# Patient Record
Sex: MALE | Race: WHITE | NOT HISPANIC OR LATINO | URBAN - METROPOLITAN AREA
[De-identification: names, ages, dates, MRNs, and addresses within clinical notes are randomized per-mention and may not be internally consistent; named-entity substitution may affect disease eponyms.]

---

## 2017-04-20 ENCOUNTER — EMERGENCY (EMERGENCY)
Facility: HOSPITAL | Age: 27
LOS: 0 days | Discharge: HOME | End: 2017-04-20
Attending: EMERGENCY MEDICINE
Payer: COMMERCIAL

## 2017-04-20 VITALS
DIASTOLIC BLOOD PRESSURE: 74 MMHG | RESPIRATION RATE: 16 BRPM | WEIGHT: 134.92 LBS | SYSTOLIC BLOOD PRESSURE: 115 MMHG | HEIGHT: 70 IN | HEART RATE: 95 BPM | TEMPERATURE: 98 F | OXYGEN SATURATION: 96 %

## 2017-04-20 VITALS
TEMPERATURE: 98 F | RESPIRATION RATE: 18 BRPM | SYSTOLIC BLOOD PRESSURE: 94 MMHG | OXYGEN SATURATION: 100 % | HEART RATE: 80 BPM | DIASTOLIC BLOOD PRESSURE: 58 MMHG

## 2017-04-20 DIAGNOSIS — F32.9 MAJOR DEPRESSIVE DISORDER, SINGLE EPISODE, UNSPECIFIED: ICD-10-CM

## 2017-04-20 DIAGNOSIS — F41.9 ANXIETY DISORDER, UNSPECIFIED: ICD-10-CM

## 2017-04-20 LAB
ALBUMIN SERPL ELPH-MCNC: 4.2 G/DL — SIGNIFICANT CHANGE UP (ref 3.3–5)
ALP SERPL-CCNC: 50 U/L — SIGNIFICANT CHANGE UP (ref 40–120)
ALT FLD-CCNC: 20 U/L — SIGNIFICANT CHANGE UP (ref 12–78)
AMPHET UR-MCNC: NEGATIVE — SIGNIFICANT CHANGE UP
ANION GAP SERPL CALC-SCNC: 6 MMOL/L — SIGNIFICANT CHANGE UP (ref 5–17)
APAP SERPL-MCNC: <2 UG/ML — LOW (ref 10–30)
APPEARANCE UR: ABNORMAL
AST SERPL-CCNC: 15 U/L — SIGNIFICANT CHANGE UP (ref 15–37)
BACTERIA # UR AUTO: ABNORMAL
BARBITURATES UR SCN-MCNC: NEGATIVE — SIGNIFICANT CHANGE UP
BASOPHILS # BLD AUTO: 0 K/UL — SIGNIFICANT CHANGE UP (ref 0–0.2)
BASOPHILS NFR BLD AUTO: 0.6 % — SIGNIFICANT CHANGE UP (ref 0–2)
BENZODIAZ UR-MCNC: NEGATIVE — SIGNIFICANT CHANGE UP
BILIRUB SERPL-MCNC: 0.4 MG/DL — SIGNIFICANT CHANGE UP (ref 0.2–1.2)
BILIRUB UR-MCNC: NEGATIVE — SIGNIFICANT CHANGE UP
BUN SERPL-MCNC: 12 MG/DL — SIGNIFICANT CHANGE UP (ref 7–23)
CALCIUM SERPL-MCNC: 9 MG/DL — SIGNIFICANT CHANGE UP (ref 8.5–10.1)
CHLORIDE SERPL-SCNC: 106 MMOL/L — SIGNIFICANT CHANGE UP (ref 96–108)
CO2 SERPL-SCNC: 30 MMOL/L — SIGNIFICANT CHANGE UP (ref 22–31)
COCAINE METAB.OTHER UR-MCNC: NEGATIVE — SIGNIFICANT CHANGE UP
COLOR SPEC: YELLOW — SIGNIFICANT CHANGE UP
COMMENT - URINE: SIGNIFICANT CHANGE UP
CREAT SERPL-MCNC: 0.69 MG/DL — SIGNIFICANT CHANGE UP (ref 0.5–1.3)
DIFF PNL FLD: NEGATIVE — SIGNIFICANT CHANGE UP
EOSINOPHIL # BLD AUTO: 0.1 K/UL — SIGNIFICANT CHANGE UP (ref 0–0.5)
EOSINOPHIL NFR BLD AUTO: 1.8 % — SIGNIFICANT CHANGE UP (ref 0–6)
EPI CELLS # UR: SIGNIFICANT CHANGE UP
ETHANOL SERPL-MCNC: <10 MG/DL — SIGNIFICANT CHANGE UP (ref 0–10)
GLUCOSE SERPL-MCNC: 100 MG/DL — HIGH (ref 70–99)
GLUCOSE UR QL: NEGATIVE MG/DL — SIGNIFICANT CHANGE UP
HCT VFR BLD CALC: 36.4 % — LOW (ref 39–50)
HGB BLD-MCNC: 13.4 G/DL — SIGNIFICANT CHANGE UP (ref 13–17)
KETONES UR-MCNC: ABNORMAL
LEUKOCYTE ESTERASE UR-ACNC: ABNORMAL
LYMPHOCYTES # BLD AUTO: 2.2 K/UL — SIGNIFICANT CHANGE UP (ref 1–3.3)
LYMPHOCYTES # BLD AUTO: 31.8 % — SIGNIFICANT CHANGE UP (ref 13–44)
MCHC RBC-ENTMCNC: 30.8 PG — SIGNIFICANT CHANGE UP (ref 27–34)
MCHC RBC-ENTMCNC: 36.9 GM/DL — HIGH (ref 32–36)
MCV RBC AUTO: 83.5 FL — SIGNIFICANT CHANGE UP (ref 80–100)
METHADONE UR-MCNC: NEGATIVE — SIGNIFICANT CHANGE UP
MONOCYTES # BLD AUTO: 0.5 K/UL — SIGNIFICANT CHANGE UP (ref 0–0.9)
MONOCYTES NFR BLD AUTO: 7.2 % — SIGNIFICANT CHANGE UP (ref 2–14)
NEUTROPHILS # BLD AUTO: 4.1 K/UL — SIGNIFICANT CHANGE UP (ref 1.8–7.4)
NEUTROPHILS NFR BLD AUTO: 58.5 % — SIGNIFICANT CHANGE UP (ref 43–77)
NITRITE UR-MCNC: NEGATIVE — SIGNIFICANT CHANGE UP
OPIATES UR-MCNC: NEGATIVE — SIGNIFICANT CHANGE UP
PCP SPEC-MCNC: SIGNIFICANT CHANGE UP
PCP UR-MCNC: NEGATIVE — SIGNIFICANT CHANGE UP
PH UR: 7 — SIGNIFICANT CHANGE UP (ref 5–8)
PLATELET # BLD AUTO: 241 K/UL — SIGNIFICANT CHANGE UP (ref 150–400)
POTASSIUM SERPL-MCNC: 4 MMOL/L — SIGNIFICANT CHANGE UP (ref 3.5–5.3)
POTASSIUM SERPL-SCNC: 4 MMOL/L — SIGNIFICANT CHANGE UP (ref 3.5–5.3)
PROT SERPL-MCNC: 7.7 GM/DL — SIGNIFICANT CHANGE UP (ref 6–8.3)
PROT UR-MCNC: 15 MG/DL
RBC # BLD: 4.36 M/UL — SIGNIFICANT CHANGE UP (ref 4.2–5.8)
RBC # FLD: 11.3 % — SIGNIFICANT CHANGE UP (ref 11–15)
SALICYLATES SERPL-MCNC: 2.4 MG/DL — LOW (ref 2.8–20)
SODIUM SERPL-SCNC: 142 MMOL/L — SIGNIFICANT CHANGE UP (ref 135–145)
SP GR SPEC: 1.01 — SIGNIFICANT CHANGE UP (ref 1.01–1.02)
THC UR QL: POSITIVE — SIGNIFICANT CHANGE UP
UROBILINOGEN FLD QL: 1 MG/DL
WBC # BLD: 7 K/UL — SIGNIFICANT CHANGE UP (ref 3.8–10.5)
WBC # FLD AUTO: 7 K/UL — SIGNIFICANT CHANGE UP (ref 3.8–10.5)
WBC UR QL: SIGNIFICANT CHANGE UP

## 2017-04-20 PROCEDURE — 99284 EMERGENCY DEPT VISIT MOD MDM: CPT

## 2017-04-20 RX ADMIN — Medication 1 MILLIGRAM(S): at 18:13

## 2017-05-09 ENCOUNTER — EMERGENCY (EMERGENCY)
Facility: HOSPITAL | Age: 27
LOS: 1 days | Discharge: HOME | End: 2017-05-09
Attending: EMERGENCY MEDICINE
Payer: COMMERCIAL

## 2017-05-09 VITALS
OXYGEN SATURATION: 98 % | RESPIRATION RATE: 20 BRPM | DIASTOLIC BLOOD PRESSURE: 81 MMHG | TEMPERATURE: 98 F | WEIGHT: 134.92 LBS | SYSTOLIC BLOOD PRESSURE: 118 MMHG | HEIGHT: 70 IN | HEART RATE: 83 BPM

## 2017-05-09 VITALS
TEMPERATURE: 99 F | DIASTOLIC BLOOD PRESSURE: 86 MMHG | HEART RATE: 83 BPM | SYSTOLIC BLOOD PRESSURE: 131 MMHG | OXYGEN SATURATION: 99 %

## 2017-05-09 DIAGNOSIS — F32.1 MAJOR DEPRESSIVE DISORDER, SINGLE EPISODE, MODERATE: ICD-10-CM

## 2017-05-09 DIAGNOSIS — F32.9 MAJOR DEPRESSIVE DISORDER, SINGLE EPISODE, UNSPECIFIED: ICD-10-CM

## 2017-05-09 PROCEDURE — 90792 PSYCH DIAG EVAL W/MED SRVCS: CPT

## 2017-05-09 PROCEDURE — 99284 EMERGENCY DEPT VISIT MOD MDM: CPT

## 2017-05-09 PROCEDURE — 99283 EMERGENCY DEPT VISIT LOW MDM: CPT

## 2017-05-09 RX ORDER — ESCITALOPRAM OXALATE 10 MG/1
1 TABLET, FILM COATED ORAL
Qty: 15 | Refills: 1 | OUTPATIENT
Start: 2017-05-09 | End: 2017-06-07

## 2017-05-09 NOTE — ED STATDOCS - CARE PLAN
Principal Discharge DX:	Major depression  Instructions for follow-up, activity and diet:	Take lexapro as prescribed.  Follow-up as scheduled with psychiatry.  Return immediately to the ER for re-evaluation if your symptoms recur or worsening.

## 2017-05-09 NOTE — ED STATDOCS - PLAN OF CARE
Take lexapro as prescribed.  Follow-up as scheduled with psychiatry.  Return immediately to the ER for re-evaluation if your symptoms recur or worsening.

## 2017-05-09 NOTE — ED ADULT TRIAGE NOTE - CHIEF COMPLAINT QUOTE
Patient arrived to ED today with c/o having major anxiety and depression issues.  Patient was in ED two weeks ago for anxiety.  Patient was told to come to ED for medication.

## 2017-05-09 NOTE — ED BEHAVIORAL HEALTH ASSESSMENT NOTE - SAFETY PLAN DETAILS
Please go to Nearest ER or call 911, If you notice any of the followin) Agitation, Aggression or Anxiety,    2) Suicidal or homicidal thoughts 3) Worsening of symptoms or 4) Side effects of medications

## 2017-05-09 NOTE — ED ADULT NURSE NOTE - OBJECTIVE STATEMENT
Patient received awake and alert, depressed mood, sleeping too much, isolative to his room, denies suicidal ideation, intent or plan. Denies any history of in-patient psych care. Lives with his parents, tells writer he spoke "Julee", at [facility] and she suggested he come here, does not have an appointment till June 6th. He is here seeking medication. Patient received awake and alert, depressed mood, sleeping too much, isolative to his room, denies suicidal ideation, intent or plan. Denies any history of in-patient psych care. Lives with his parents, tells writer he spoke "Julee", at [facility] and she suggested he come here, does not have an appointment till June 6th to see a Psychiatrist. He is here seeking medication. 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

## 2017-05-09 NOTE — ED BEHAVIORAL HEALTH ASSESSMENT NOTE - DESCRIPTION
Patient was laying in bed, in no acute distress. He appeared forthcoming. none Currently living with mother and father. Worked as a nursing aide

## 2017-05-09 NOTE — ED STATDOCS - OBJECTIVE STATEMENT
27 y/o male presents to ED with mother at bedside c/o anxiety and depression x several months. Pt reports that he has been unable to complete his usual daily activities at times and doesn't want to get out of bed in the morning and "feels fragile". He notes +recent stressors due to recent move from [place]. Pt was seen at [facility] 3 weeks ago, given Ativan and followed up with [facility] counseling, though mother assisted him in making arrangements with local counselor whom he saw today (Julee). He has a scheduled appointment with psychiatrist on June 6th, though Julee advised him to seek ED evaluation today for expedited psychiatric evaluation. No SI, HI or auditory/visual hallucinations. Pt is otherwise healthy, no PMHx or PSHx.  No h/o admission to inpatient psychiatric facility. Current smoker, occasional EtOH, occasional marijuana. NKDA. No further complaints at this time.

## 2017-05-09 NOTE — ED BEHAVIORAL HEALTH ASSESSMENT NOTE - SUMMARY
Patient is a 26 year old, homosexual male domicile with parents (as of one week ago) ; single; noncaregiver; unemployed, no significant PPH; no ;prior hospitalizations; no known suicide attempts; no known history of violence or arrests; no active substance abuse or known history of complicated withdrawal; no significant past medical history ; brought in by self accompanied by mother for evaluation of depression with requests to start treatment with medications.  Patient reports several stressors which include resigning from a job he liked, moving back with [facility], and his room mate (and ex boyfriend) moving back to [place]. He describes symptoms consistent with Major Depressive disorder--moderate.  Patient is not suicidal or homicidal and does not require inpatient psychiatric hospitalizations.  Tx options were discussed. Will start lexapro 10 mg daily to tx anxiety/depressive sx's, Risks, benefits and common side effects were discussed. Recommended patient take 1/2 tab x 3 days and if tolerating well take full tablet.  Patient to follow up with therapist and psychiatrist at [facility].  Intake appointment with Dr. Guzman on June 6th

## 2017-05-09 NOTE — ED STATDOCS - PROGRESS NOTE DETAILS
Spoke with psych NP Mindy Pt to be moved to  for further evaluation. Patient seen by Dr Mcmullen: recommends treatment with lexapro and outpatient therapy.

## 2017-05-09 NOTE — ED BEHAVIORAL HEALTH ASSESSMENT NOTE - OTHER PAST PSYCHIATRIC HISTORY (INCLUDE DETAILS REGARDING ONSET, COURSE OF ILLNESS, INPATIENT/OUTPATIENT TREATMENT)
No formal psychiatric history. Reports friend gave him a Klonopin once that helped when he was having an anxiety attack. He also notes that he was given Ativan at [facility] several weeks ago also for anxiety which he found helpful. He has no h/o prior suicide attempts.

## 2017-05-09 NOTE — ED BEHAVIORAL HEALTH ASSESSMENT NOTE - RISK ASSESSMENT
Concerning patient's suicide risk, I feel the patient's current risk is low, , as evidenced by  denial of immediate ideation or intent, no history of suicide attempts, no current drinking, a sincere interest in getting help and the genuineness of patients safety moreno without demonstrable ambivalence, future oriented, with good support

## 2017-05-09 NOTE — ED BEHAVIORAL HEALTH ASSESSMENT NOTE - HPI (INCLUDE ILLNESS QUALITY, SEVERITY, DURATION, TIMING, CONTEXT, MODIFYING FACTORS, ASSOCIATED SIGNS AND SYMPTOMS)
Patient is a 26 year old, homosexual male domicile with parents (as of one week ago) ; single; noncaregiver; unemployed, no significant PPH; no ;prior hospitalizations; no known suicide attempts; no known history of violence or arrests; no active substance abuse or known history of complicated withdrawal; no significant past medical history ; brought in by self accompanied by mother for evaluation of depression with requests to start treatment with medications.         Patient reports that his main stressors are resigning from his job that he enjoys, and his room mate moving back to [place]. As a result he moved back with his parents one week ago.  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 Patient's room mate was also his ex boyfriend, although they remain as friends and he realizes that he misses him terribly since he moved three weeks ago.         Patient mentions that for the last 2-3 weeks he has been more depressed, less motivated, over sleeping, variable appetite, crying at times, with poor concentration and low energy.  He has not been motivated to leave the house, and feels his emotional state is fragile.  He admits that he has been having difficulty adjusting. He does note that his parents are supportive and adamantly denies any S/H I/I/P.  He also describes attacks of anxiety that can last several hours usually triggered by thoughts about his room mate.  He feels that this is improving, and he remains in phone contact. Today he saw a therapist, Samantha, in [facility] who recommended patient come to ER to initiate medication.  He already has an intake appointment with psychiatrist, Dr. Guzman, on June 6th at [facility].       Concerning other psychiatric symptoms, pt denies any aggressive or violent behavior towards others. Pt denies any episodes of bizarre happiness, unusual energy, unusual nightime excitation or other common symptoms of ross. Pt denies hearing voices or seeing things.  No delusions were elicited.  Patient denies , obsessions or compulsions.     Spoke with pt's mother, who corroborated account.  She notes patient has been more depressed over the last two weeks and has a difficul time adjusting.  She has no safety concerns.

## 2017-05-09 NOTE — ED BEHAVIORAL HEALTH ASSESSMENT NOTE - SUICIDE PROTECTIVE FACTORS
Responsibility to family and others/Identifies reasons for living/Supportive social network or family/Future oriented

## 2020-12-18 NOTE — ED ADULT NURSE NOTE - PRIMARY CARE PROVIDER
Your current Plastic Surgery problem we are working together to treat is: Skin Lesion.    ROUTINE CARE OF A SUTURED WOUND ON THE FACE    Activities  · Avoid vigorous activities for 24 hrs after the procedure.  · Some bleeding for 24-36 hours is expected and can be controlled with pressure using a gauze bandage    Dressing/Washing  · A sutured incision should be kept dry until 24 hrs after closure.  · IF a bandage was placed over the incision, it may be removed the day following the procedure and washed with plain soap and water.    · After showering, the dressing need not be re-applied.  IF you would like to cover the wound for appearance, that is fine.  · Apply aquaphor or vaseline to the incision for 5 days.  After 5 days, leaving the incision open to the air and dry is fine.  · Dry blood or crusting may be noted along the suture line. A Q-tip and Hydrogen Peroxide may be used to gently clean the incision line.  Gently wash after using peroxide.  · Make up, hair care products, aftershave, lotions, etc should not be used near the repaired wound for 10 days.      Medications  · It is uncommon to take antibiotics after a skin lesion excision.  However, if antibiotics were prescribed, continue to take the pills as instructed until they are gone.  · For pain, over-the-counter tylenol or ibuprofen may be used.      Scar Healing  · New scars are sensitive to the sun.  Avoid direct/prolonged scar exposure to the sun while it remains red/pink.  Use a sun screen to protect the scar if exposure cannot be avoided.  · Redness is to be expected as a part of healing, but if the scar becomes thickened or irritated, call the office.  · Follow up with your primary physician or dermatologist if new lesions of concern develop (If you had skin cancer, plan follow up with the dermatologist every 3-6 months).  · Scar massage with any type of hypoallergenic lotion helps scar fading.      Call the Office  · IF you develop significant pain  unrelieved by pain medication  · Some swelling will be present.  Call if you note increasing swelling and pain   · IF fever, redness, increasing pain or tenderness develop   · IF you note significant bleeding, drainage, or pus at the surgical site.    REMOVE BROWN TAPE ON WEDNESDAY          Two weeks after surgery please start scar massage (vigorously rub the scar for 15 minutes 3 times per day; recommended lotions include VaniCream Lite Lotion, Cera-Ve, or Eucerin)  Sunscreen at all times; sunscreen should include either titanium dioxide or zinc; recommended sunscreens include VaniCream with spf or Colorscience mineral powder      FOLLOW-UP  It is recommended you schedule a follow-up appointment with Dr. Rodriges: As needed.      Office hours are 8:00 am to 5:00 pm Monday through Friday. If it is urgent that you speak with someone outside of these hours, the [facility] will be able to assist you. You can reach the office by calling [phone].    Thank you for choosing Gray Rodriges M.D. as your Plastic Surgery provider!    At [facility], one important tool we use to improve our patient services is our Patient Survey. Following your visit you may receive our survey in the mail.     · Please take the time to complete the survey.     · If your visit with us was great, we want to hear about it.     · If we can improve, please let us know how.        none

## 2023-04-18 NOTE — ED BEHAVIORAL HEALTH ASSESSMENT NOTE - AFFECT QUALITY
Ambulatory Care Coordination Note    ACM attempted to reach patient for introduction to Associate Care Management related to ER visit 4/17/23 for RLQ pain. HIPAA compliant message left requesting a return phone call at patient convenience. Plan for follow-up call in 1-2 days      No future appointments.
Depressed

## 2023-04-20 NOTE — ED ADULT NURSE NOTE - DOES PATIENT HAVE ADVANCE DIRECTIVE
Refill request received for Fosamax    Pending appointment 6/14/23    Per last office note on 12/5/22 - Continue alendronate 70 mg weekly    Labs up to date    Refill sent to pharmacy    
No

## 2024-05-06 PROBLEM — Z00.00 ENCOUNTER FOR PREVENTIVE HEALTH EXAMINATION: Status: ACTIVE | Noted: 2024-05-06

## 2024-06-12 ENCOUNTER — APPOINTMENT (OUTPATIENT)
Dept: ORTHOPEDIC SURGERY | Facility: CLINIC | Age: 34
End: 2024-06-12

## 2025-07-02 NOTE — ED ADULT TRIAGE NOTE - NS ED NURSE DIRECT TO ROOM YN
"Chief Complaint  Hyperlipidemia    Subjective        Alonzo Haque presents to [facility]  History of Present Illness  41-year-old male presenting with hyperlipidemia, neurogenic bladder and GERD.  Has slim down some.  Is down to 155 pounds.  States he is feeling great.  He is eating a high-protein diet.  Eats once a day.  Is trying to stay hydrated but could do a better job.  Working at local [employer].    Has neurogenic bladder and has to catheterize himself to urinate.  New order to be filled when needed.    Taking atorvastatin as prescribed.  Tolerating well.  Hyperlipidemia        Objective   Vital Signs:  /92 (BP Location: Left arm, Patient Position: Sitting, Cuff Size: Large Adult)   Pulse 81   Temp 97.6 °F (36.4 °C) (Temporal)   Ht 175.3 cm (69.02\")   Wt 70.4 kg (155 lb 3.2 oz)   SpO2 97%   BMI 22.91 kg/m²   Estimated body mass index is 22.91 kg/m² as calculated from the following:    Height as of this encounter: 175.3 cm (69.02\").    Weight as of this encounter: 70.4 kg (155 lb 3.2 oz).       BMI is within normal parameters. No other follow-up for BMI required.      Physical Exam  Vitals reviewed.   Constitutional:       Appearance: Normal appearance.   Cardiovascular:      Rate and Rhythm: Normal rate and regular rhythm.      Pulses: Normal pulses.      Heart sounds: Normal heart sounds.   Musculoskeletal:      Cervical back: Normal range of motion.      Comments: Baseline     Skin:     General: Skin is warm and dry.      Capillary Refill: Capillary refill takes less than 2 seconds.   Neurological:      Mental Status: He is alert and oriented to person, place, and time.      Comments: baseline   Psychiatric:         Mood and Affect: Mood normal.         Behavior: Behavior normal.         Thought Content: Thought content normal.         Judgment: Judgment normal.        Result Review :            Current Outpatient Medications on File Prior to Visit   Medication Sig " Dispense Refill    Catheters (InCare Straight 14FR/20CM) misc Use 1 Application 3 (Three) Times a Day. 150 each 3    [DISCONTINUED] atorvastatin (LIPITOR) 40 MG tablet Take 1 tablet by mouth Daily. 90 tablet 1    [DISCONTINUED] omeprazole (priLOSEC) 40 MG capsule Take 1 capsule by mouth Daily As Needed (Heartburn). TAKE ONE CAPSULE BY MOUTH DAILY. 90 capsule 1    [DISCONTINUED] sertraline (Zoloft) 25 MG tablet Take 1 tablet by mouth Daily. 90 tablet 1     No current facility-administered medications on file prior to visit.                Assessment and Plan     Diagnoses and all orders for this visit:    1. Neurogenic bladder (Primary)    2. Mixed hyperlipidemia  -     Comprehensive Metabolic Panel  -     Lipid Panel With / Chol / HDL Ratio  -     atorvastatin (LIPITOR) 40 MG tablet; Take 1 tablet by mouth Daily.  Dispense: 90 tablet; Refill: 1    3. Dysthymia  -     Comprehensive Metabolic Panel  -     Lipid Panel With / Chol / HDL Ratio  -     sertraline (Zoloft) 25 MG tablet; Take 1 tablet by mouth Daily.  Dispense: 90 tablet; Refill: 1    4. Gastroesophageal reflux disease without esophagitis  -     omeprazole (priLOSEC) 40 MG capsule; Take 1 capsule by mouth Daily As Needed (Heartburn). TAKE ONE CAPSULE BY MOUTH DAILY.  Dispense: 90 capsule; Refill: 1        Patient Instructions   Continue medications as prescribed.  Stay hydrated with water.  Labs today.  Recommend flu shot in the fall.  Follow-up in 6 months for Medicare wellness and fasting labs.           Follow Up     Return in about 6 months (around 1/2/2026) for Medicare Wellness.  Patient was given instructions and counseling regarding his condition or for health maintenance advice. Please see specific information pulled into the AVS if appropriate.        No